# Patient Record
Sex: FEMALE | Race: WHITE | Employment: PART TIME | URBAN - METROPOLITAN AREA
[De-identification: names, ages, dates, MRNs, and addresses within clinical notes are randomized per-mention and may not be internally consistent; named-entity substitution may affect disease eponyms.]

---

## 2018-03-27 ENCOUNTER — OFFICE VISIT (OUTPATIENT)
Dept: FAMILY PLANNING/WOMEN'S HEALTH CLINIC | Age: 53
End: 2018-03-27

## 2018-03-27 ENCOUNTER — HOSPITAL ENCOUNTER (OUTPATIENT)
Dept: MAMMOGRAPHY | Age: 53
Discharge: HOME | End: 2018-03-27

## 2018-03-27 VITALS — DIASTOLIC BLOOD PRESSURE: 54 MMHG | SYSTOLIC BLOOD PRESSURE: 93 MMHG

## 2018-03-27 DIAGNOSIS — Z12.31 VISIT FOR SCREENING MAMMOGRAM: ICD-10-CM

## 2018-03-27 DIAGNOSIS — Z01.419 ENCOUNTER FOR WELL WOMAN EXAM: Primary | ICD-10-CM

## 2018-03-27 PROCEDURE — 77067 SCR MAMMO BI INCL CAD: CPT

## 2018-03-27 NOTE — PROGRESS NOTES
Assessment/Plan:    Diagnoses and all orders for this visit:    1. Encounter for well woman exam    Pap due in 2020 (had pap in 2015 with neg HPV and neg cytology)  Heme stool guaiac neg testing       Follow-up Disposition: Not on File    [facility] JOSE Herr expressed understanding of this plan. An AVS was printed and given to the patient.      ----------------------------------------------------------------------    Chief Complaint   Patient presents with    Well Woman     EWL visit       History of Present Illness:  , last period 2 years ago  No hot flashes or vaginal dryness  Pap in 2015 next due in   Not sexually active so no pain with intercourse  Single, cares for her special needs child   Does not have any concerns over her breasts, has not noted any new changes/ masses       No past medical history on file. Current Outpatient Prescriptions   Medication Sig Dispense Refill    albuterol (PROVENTIL HFA, VENTOLIN HFA, PROAIR HFA) 90 mcg/actuation inhaler Take  by inhalation.  ibuprofen 200 mg cap Take 400 mg by mouth.  HYDROcodone-acetaminophen (NORCO) 5-325 mg per tablet Take 1 Tab by mouth every four (4) hours as needed for Pain. Max Daily Amount: 6 Tabs. 20 Tab 0    naproxen (NAPROSYN) 500 mg tablet Take 1 Tab by mouth every twelve (12) hours as needed for Pain. 30 Tab 0       Allergies   Allergen Reactions    Codeine Anaphylaxis       Social History   Substance Use Topics    Smoking status: Current Every Day Smoker     Packs/day: 2.00     Years: 2.00    Smokeless tobacco: Never Used      Comment: Still has Quit Now [place]    Alcohol use No       No family history on file. Physical Exam:     Visit Vitals    BP 93/54    LMP 2013       A&Ox3  WDWN NAD  Respirations normal and non labored  Breast exam- evert neg for masses, tenderness, dimpling, retractions, skin color changes  Pelvic exam- ext neg for lesions or discharge.  Cervix and vagina are w/out lesions or discharge.  Uterus and adnexal exam neg for mass or tenderness  Rectal exam- no masses in rectum, heme neg guaiac stool neg

## 2018-03-27 NOTE — PROGRESS NOTES
EVERY WOMANS LIFE HISTORY QUESTIONNAIRE       No Yes Comments   Has a doctor ever seen or felt anything wrong with your breast? []                                  [x]                                  R. Breast, dense tissue area   Have you ever had a breast biopsy? [x]                                  []                                          When and where was last mammogram performed? 11/2016    Have you ever been told that there was a problem on your mammogram?   No Yes Comments   []                                  [x]                                  Dense, areas of [address]     Do you have breast implants? No Yes Comments   [x]                                  []                                       When was your last Pap test performed? 9/2015 at [facility], Neg with neg hpv    Have you ever had an abnormal Pap test?   No Yes Comments   [x]                                  []                                       Have you had a hysterectomy? No Yes Comments (why)   [x]                                  []                                       Have you ever been diagnosed with any type of Cancer   No Yes Comments (type,when,where,type of treatment   [x]                                  []                                          Has a family member been diagnosed with breast or ovarian cancer? No Yes Comments (which family members, and type   []                                  [x]                                  3 sisters with hysterectomy for ovarian/or pre ovarian cancer     Did your mother take CHAKA? No Yes Unknown   []                                  []                                  X     Do you have a history of HIV exposure? No Yes    [x]                                  []                                         Have you been through menopause?    No Yes Date of LMP   []                                  [x]                                  2 yrs ago     Are you taking hormone replacement therapy (HRT)     No Yes Comments   [x]                                  []                                       How many times have you been pregnant? 5     Number of live births ? 3    Are you experiencing any of the following? No Yes Comments   Nipple Discharge [x]                                  []                                     Breast Lump/Masses [x]                                  []                                     Breast Skin Changes [x]                                  []                                          No Yes Comments   Vaginal Discharge [x]                                  []                                     Abnormal/unusual vaginal bleeding [x]                                  []                                         Are you experiencing any other health problems?     None (asthma off and on)---goes to [facility] as needed

## 2022-08-20 ENCOUNTER — HOSPITAL ENCOUNTER (EMERGENCY)
Age: 57
Discharge: HOME | End: 2022-08-20
Attending: EMERGENCY MEDICINE
Payer: MEDICAID

## 2022-08-20 VITALS
TEMPERATURE: 97.6 F | OXYGEN SATURATION: 100 % | DIASTOLIC BLOOD PRESSURE: 74 MMHG | BODY MASS INDEX: 21.04 KG/M2 | SYSTOLIC BLOOD PRESSURE: 120 MMHG | WEIGHT: 123.24 LBS | HEIGHT: 64 IN | HEART RATE: 86 BPM | RESPIRATION RATE: 17 BRPM

## 2022-08-20 DIAGNOSIS — R21 RASH AND OTHER NONSPECIFIC SKIN ERUPTION: ICD-10-CM
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
## 2022-08-20 PROCEDURE — 99283 EMERGENCY DEPT VISIT LOW MDM: CPT

## 2022-08-20 RX ORDER — OXYCODONE HYDROCHLORIDE 5 MG/1
5 TABLET ORAL
Qty: 10 TABLET | Refills: 0 | Status: SHIPPED | OUTPATIENT
Start: 2022-08-20 | End: 2022-08-23

## 2022-08-20 RX ORDER — DOXYCYCLINE HYCLATE 100 MG
100 TABLET ORAL 2 TIMES DAILY
Qty: 14 TABLET | Refills: 0 | Status: SHIPPED | OUTPATIENT
Start: 2022-08-20 | End: 2022-08-27

## 2022-08-21 NOTE — ED NOTES
Bedside and Verbal shift change report given to [address] [place] [address] (oncoming nurse) by [place] RN (offgoing nurse). Report included the following information SBAR, Kardex, ED Summary and MAR.

## 2022-08-21 NOTE — ED PROVIDER NOTES
EMERGENCY DEPARTMENT HISTORY AND PHYSICAL EXAM      Date: 8/20/2022  Patient Name: Chris Ann-Marie    History of Presenting Illness     Chief Complaint   Patient presents with    Insect Bite     States had insect bite on right scapula 2 days ago that now has rash spreading down her right back and right arm. Rash is reddened raised patchy and itchy. History Provided By: Patient    HPI: Fazalkimberly Connerr, 49 y. o.smoking female without significant PMHx presents BIB self to the ED with cc of painful rash and suspected insect bite sustained 3 days ago. The patient was out mowing the lawn and thought she had maybe been stung by a bee 3 times at her right upper back. She came inside afterwards and saw 3 small bumps that look like a mosquito bite. Over the last 3 days she has had a spreading rash from the initial site, as well as pain that is extending towards her right axilla and the medial aspect of the right upper arm. The rash is not itchy in character. The pain is described as burning and severe in character, unrelieved with Tylenol, ibuprofen, Lidoderm patches, or Benadryl. No rash elsewhere. The patient denies fevers, sweats, chest pain, shortness of breath, headache, dizziness. There are no other complaints, changes, or physical findings at this time. PCP: None    No current facility-administered medications on file prior to encounter. Current Outpatient Medications on File Prior to Encounter   Medication Sig Dispense Refill    ibuprofen 200 mg cap Take 400 mg by mouth. HYDROcodone-acetaminophen (NORCO) 5-325 mg per tablet Take 1 Tab by mouth every four (4) hours as needed for Pain. Max Daily Amount: 6 Tabs. 20 Tab 0    naproxen (NAPROSYN) 500 mg tablet Take 1 Tab by mouth every twelve (12) hours as needed for Pain. 30 Tab 0    albuterol (PROVENTIL HFA, VENTOLIN HFA, PROAIR HFA) 90 mcg/actuation inhaler Take  by inhalation.          Past History     Past Medical History:  Past Medical History: Diagnosis Date    Menopause        Past Surgical History:  Past Surgical History:   Procedure Laterality Date    HX ORTHOPAEDIC      Bilateral bunion removal       Family History:  No family history on file. Social History:  Social History     Tobacco Use    Smoking status: Every Day     Packs/day: 2.00     Years: 2.00     Pack years: 4.00     Types: Cigarettes    Smokeless tobacco: Never    Tobacco comments:     Still has Quit Now Brochure   Substance Use Topics    Alcohol use: No     Alcohol/week: 0.0 standard drinks       Allergies: Allergies   Allergen Reactions    Codeine Anaphylaxis         Review of Systems   Review of Systems   Constitutional:  Negative for chills, diaphoresis and fever. Respiratory:  Negative for shortness of breath. Cardiovascular:  Negative for chest pain. Skin:  Positive for rash. Neurological:  Negative for dizziness. Hematological:  Does not bruise/bleed easily. Physical Exam   Physical Exam  Vitals and nursing note reviewed. Constitutional:       General: She is not in acute distress. Appearance: Normal appearance. She is well-developed. She is not toxic-appearing. HENT:      Head: Normocephalic and atraumatic. Eyes:      General: Lids are normal.      Extraocular Movements: Extraocular movements intact. Conjunctiva/sclera: Conjunctivae normal.   Cardiovascular:      Rate and Rhythm: Normal rate and regular rhythm. Pulmonary:      Effort: Pulmonary effort is normal.      Breath sounds: Normal breath sounds. Musculoskeletal:         General: Normal range of motion. Cervical back: Normal range of motion and neck supple. Skin:     General: Skin is warm and dry. Comments: Raised, rough, irregular patches to the right posterior shoulder. see photo. The rash does not appear to extend to the axilla or right upper arm where the patient reports pain. Neurological:      General: No focal deficit present.       Mental Status: She is alert and oriented to person, place, and time. Psychiatric:         Mood and Affect: Mood normal.         Behavior: Behavior normal. Behavior is cooperative. Diagnostic Study Results     Labs -   No results found for this or any previous visit (from the past 12 hour(s)). Radiologic Studies -   No orders to display     CT Results  (Last 48 hours)      None          CXR Results  (Last 48 hours)      None              Medical Decision Making   I am the first provider for this patient. I reviewed the vital signs, available nursing notes, past medical history, past surgical history, family history and social history. Vital Signs-Reviewed the patient's vital signs. Patient Vitals for the past 12 hrs:   Temp Pulse Resp BP SpO2   08/20/22 2220 97.6 °F (36.4 °C) 86 17 120/74 100 %       Records Reviewed: Nursing Notes and Old Medical Records    Provider Notes (Medical Decision Making):   Rash does not appear to be allergic in etiology, as the lesions are not consistent with urticaria, and the patient denies any itching. Given the severity of the pain and the spread, I believe is reasonable to treat with antibiotics and stronger pain medications. Advised on risk/benefits/side effects of medications. Recommended follow-up with PCP, dermatology. Strict ED return precautions given. The patient is in agreement this plan. ED Course:   Initial assessment performed. The patients presenting problems have been discussed, and they are in agreement with the care plan formulated and outlined with them. I have encouraged them to ask questions as they arise throughout their visit. Critical Care Time: None    Disposition:  dc    PLAN:  1. Current Discharge Medication List        START taking these medications    Details   doxycycline (VIBRA-TABS) 100 mg tablet Take 1 Tablet by mouth two (2) times a day for 7 days.   Qty: 14 Tablet, Refills: 0  Start date: 8/20/2022, End date: 8/27/2022      oxyCODONE IR (Roxicodone) 5 mg immediate release tablet Take 1 Tablet by mouth every six (6) hours as needed for Pain for up to 3 days. Max Daily Amount: 20 mg.  Qty: 10 Tablet, Refills: 0  Start date: 8/20/2022, End date: 8/23/2022    Comments: Attending Dr. Zehra Ibarra  Associated Diagnoses: Insect bite, unspecified site, initial encounter           2. Follow-up Information       Follow up With Specialties Details Why Contact Info    [facility] EMERGENCY DEPT Emergency Medicine  As needed, If symptoms worsen [address] [facility]  Schedule an appointment as soon as possible for a visit  For follow up [facility] [id number] [facility]    [address] [facility]  Call  For follow up [address]  [phone]          Return to ED if worse     Diagnosis     Clinical Impression:   1. Insect bite, unspecified site, initial encounter    2. Rash and other nonspecific skin eruption          Please note that this dictation was completed with [employer], the computer voice recognition software. Quite often unanticipated grammatical, syntax, homophones, and other interpretive errors are inadvertently transcribed by the computer software. Please disregards these errors. Please excuse any errors that have escaped final proofreading.

## 2022-08-24 ENCOUNTER — HOSPITAL ENCOUNTER (EMERGENCY)
Age: 57
Discharge: HOME | End: 2022-08-24
Attending: STUDENT IN AN ORGANIZED HEALTH CARE EDUCATION/TRAINING PROGRAM
Payer: MEDICAID

## 2022-08-24 VITALS
OXYGEN SATURATION: 98 % | WEIGHT: 121.25 LBS | HEIGHT: 64 IN | TEMPERATURE: 98.3 F | DIASTOLIC BLOOD PRESSURE: 54 MMHG | RESPIRATION RATE: 16 BRPM | SYSTOLIC BLOOD PRESSURE: 101 MMHG | BODY MASS INDEX: 20.7 KG/M2 | HEART RATE: 84 BPM

## 2022-08-24 DIAGNOSIS — B02.9 HERPES ZOSTER WITHOUT COMPLICATION: Primary | ICD-10-CM

## 2022-08-24 PROCEDURE — 99283 EMERGENCY DEPT VISIT LOW MDM: CPT

## 2022-08-24 RX ORDER — VALACYCLOVIR HYDROCHLORIDE 1 G/1
1000 TABLET, FILM COATED ORAL 3 TIMES DAILY
Qty: 21 TABLET | Refills: 0 | Status: SHIPPED | OUTPATIENT
Start: 2022-08-24 | End: 2022-08-31

## 2022-08-24 RX ORDER — OXYCODONE HYDROCHLORIDE 5 MG/1
5 TABLET ORAL
Qty: 9 TABLET | Refills: 0 | Status: SHIPPED | OUTPATIENT
Start: 2022-08-24 | End: 2022-08-27

## 2022-08-24 NOTE — ED NOTES
Pt presents to ER w/ rash that has progressively worsened since Wed. She states that she feel pain \"to the bone. \" She was previously seen in the ER and advised to to take Benadryl, Tylenol, and Oxycodone but has found no relief. Assisted to position of comfort, call bell within reach.

## 2022-08-24 NOTE — ED PROVIDER NOTES
EMERGENCY DEPARTMENT HISTORY AND PHYSICAL EXAM      Date: 2022  Patient Name: Gena Ferrer    History of Presenting Illness     Chief Complaint   Patient presents with    Skin Problem     Pt states she was seen here Friday for singular spider bite but now bumps have spread and become very painful, not vaccinated against shingles     History Provided By: Patient    HPI: Gena Ferrer, 49 y.o. female with no significant past medical history presents to the ED with cc of painful skin lesion x1 week. Patient was seen in the ED several days ago for the same. States that initially started as 3 little bumps. Initially suspected to be secondary to possible spider bite. She was prescribed doxycycline as well as oxycodone. States the day after she came to the ED, this lesion has subsequently spread-and now she has noted lesions over her right upper back, right chest, as well as a few scattered lesions over her right arm. She reports there is associated sharp pain over the areas of involvement. She reports subjective fevers and chills since symptom onset. She has no risk factors for immunosuppression. She is unsure if she had a history of childhood chickenpox. She has not had contact with anyone who had similar lesions. She denies any neck stiffness or neurologic symptoms of concern. No vision changes or eye pain. PCP: None    No current facility-administered medications on file prior to encounter. Current Outpatient Medications on File Prior to Encounter   Medication Sig Dispense Refill    doxycycline (VIBRA-TABS) 100 mg tablet Take 1 Tablet by mouth two (2) times a day for 7 days. 14 Tablet 0    [] oxyCODONE IR (Roxicodone) 5 mg immediate release tablet Take 1 Tablet by mouth every six (6) hours as needed for Pain for up to 3 days. Max Daily Amount: 20 mg. 10 Tablet 0    ibuprofen 200 mg cap Take 400 mg by mouth.       HYDROcodone-acetaminophen (NORCO) 5-325 mg per tablet Take 1 Tab by mouth every four (4) hours as needed for Pain. Max Daily Amount: 6 Tabs. 20 Tab 0    naproxen (NAPROSYN) 500 mg tablet Take 1 Tab by mouth every twelve (12) hours as needed for Pain. 30 Tab 0    albuterol (PROVENTIL HFA, VENTOLIN HFA, PROAIR HFA) 90 mcg/actuation inhaler Take  by inhalation. Past History     Past Medical History:  Past Medical History:   Diagnosis Date    Menopause        Past Surgical History:  Past Surgical History:   Procedure Laterality Date    HX ORTHOPAEDIC      Bilateral bunion removal       Family History:  No family history on file. Social History:  Social History     Tobacco Use    Smoking status: Every Day     Packs/day: 2.00     Years: 2.00     Pack years: 4.00     Types: Cigarettes    Smokeless tobacco: Never    Tobacco comments:     Still has Quit Now Brochure   Substance Use Topics    Alcohol use: No     Alcohol/week: 0.0 standard drinks       Allergies: Allergies   Allergen Reactions    Codeine Anaphylaxis         Review of Systems   Review of Systems   Constitutional:  Positive for chills and fever. HENT:  Negative for congestion and rhinorrhea. Eyes:  Negative for visual disturbance. Respiratory:  Negative for chest tightness and shortness of breath. Cardiovascular:  Negative for chest pain and palpitations. Gastrointestinal:  Negative for abdominal pain, diarrhea, nausea and vomiting. Genitourinary:  Negative for dysuria, flank pain and hematuria. Musculoskeletal:  Negative for back pain and neck pain. Skin:  Positive for rash. Allergic/Immunologic: Negative for immunocompromised state. Neurological:  Negative for dizziness, speech difficulty, weakness and headaches. Hematological:  Negative for adenopathy. Psychiatric/Behavioral:  Negative for dysphoric mood and suicidal ideas. Physical Exam   Physical Exam  Vitals and nursing note reviewed. Constitutional:       General: She is not in acute distress.      Appearance: She is not ill-appearing or toxic-appearing. HENT:      Head: Normocephalic and atraumatic. Nose: Nose normal.      Mouth/Throat:      Mouth: Mucous membranes are moist.   Eyes:      Extraocular Movements: Extraocular movements intact. Pupils: Pupils are equal, round, and reactive to light. Cardiovascular:      Rate and Rhythm: Normal rate and regular rhythm. Pulses: Normal pulses. Pulmonary:      Effort: Pulmonary effort is normal.      Breath sounds: No stridor. No wheezing or rhonchi. Abdominal:      General: Abdomen is flat. There is no distension. Tenderness: There is no abdominal tenderness. Musculoskeletal:         General: Normal range of motion. Cervical back: Normal range of motion and neck supple. Skin:     General: Skin is warm and dry. Comments: Both vesicular and maculopapular appearing lesions noted over R thoracic back, R anterior chest wall, and a few scattered over R arm. No open lesions. No signs of overlying soft tissue infection. Neurological:      General: No focal deficit present. Mental Status: She is alert and oriented to person, place, and time. Psychiatric:         Judgment: Judgment normal.       Diagnostic Study Results     Labs -   No results found for this or any previous visit (from the past 24 hour(s)). Radiologic Studies -   No orders to display     CT Results  (Last 48 hours)      None          CXR Results  (Last 48 hours)      None            Medical Decision Making   IAmber MD am the first provider for this patient, and I am the attending of record for this patient encounter. I reviewed the vital signs, available nursing notes, past medical history, past surgical history, family history and social history. Vital Signs-Reviewed the patient's vital signs.   Patient Vitals for the past 24 hrs:   Temp Pulse Resp BP SpO2   08/24/22 1756 98.3 °F (36.8 °C) 84 16 (!) 101/54 98 %     Records Reviewed: Nursing Notes and Old Medical Records    Provider Notes (Medical Decision Making):   Ddx: shingles, viral exanthem, pityriasis, eczema, dermatitis, etc.     Will treat patient with course of Valtrex, pain medication. We will have her follow-up with primary care physician for repeat evaluation in 2 to 3 days. She felt comfortable with this plan. Reasons to return to the ED were discussed. ED Course:   Initial assessment performed. The patient's presenting problems have been discussed, and they are in agreement with the care plan formulated and outlined with them. I have encouraged them to ask questions as they arise throughout their visit. Amber Rodrigez MD      Disposition:  Discharge      DISCHARGE PLAN:  1. Discharge Medication List as of 2022  6:44 PM        START taking these medications    Details   valACYclovir (VALTREX) 1 gram tablet Take 1 Tablet by mouth three (3) times daily for 7 days. , Normal, Disp-21 Tablet, R-0           CONTINUE these medications which have CHANGED    Details   oxyCODONE IR (Roxicodone) 5 mg immediate release tablet Take 1 Tablet by mouth every six (6) hours as needed for Pain for up to 3 days. Max Daily Amount: 20 mg., Normal, Disp-9 Tablet, R-0           CONTINUE these medications which have NOT CHANGED    Details   doxycycline (VIBRA-TABS) 100 mg tablet Take 1 Tablet by mouth two (2) times a day for 7 days. , Normal, Disp-14 Tablet, R-0      ibuprofen 200 mg cap Take 400 mg by mouth., Historical Med      naproxen (NAPROSYN) 500 mg tablet Take 1 Tab by mouth every twelve (12) hours as needed for Pain., Print, Disp-30 Tab, R-0      albuterol (PROVENTIL HFA, VENTOLIN HFA, PROAIR HFA) 90 mcg/actuation inhaler Take  by inhalation. , Historical Med           STOP taking these medications       HYDROcodone-acetaminophen (NORCO) 5-325 mg per tablet Comments:   Reason for Stoppin.   Follow-up Information       Follow up With Specialties Details Why Contact Info    Your PCP  In 2 days 3.  Return to ED if worse     Diagnosis     Clinical Impression:   1. Herpes zoster without complication        Attestations:    Ashkan Leo MD    Please note that this dictation was completed with [employer], the computer voice recognition software. Quite often unanticipated grammatical, syntax, homophones, and other interpretive errors are inadvertently transcribed by the computer software. Please disregard these errors. Please excuse any errors that have escaped final proofreading. Thank you.